# Patient Record
Sex: MALE | Race: WHITE | NOT HISPANIC OR LATINO | Employment: FULL TIME | URBAN - METROPOLITAN AREA
[De-identification: names, ages, dates, MRNs, and addresses within clinical notes are randomized per-mention and may not be internally consistent; named-entity substitution may affect disease eponyms.]

---

## 2018-07-31 ENCOUNTER — OFFICE VISIT (OUTPATIENT)
Dept: FAMILY MEDICINE CLINIC | Facility: CLINIC | Age: 53
End: 2018-07-31

## 2018-07-31 VITALS
DIASTOLIC BLOOD PRESSURE: 70 MMHG | BODY MASS INDEX: 29.92 KG/M2 | WEIGHT: 202 LBS | HEIGHT: 69 IN | TEMPERATURE: 97.7 F | SYSTOLIC BLOOD PRESSURE: 128 MMHG | HEART RATE: 108 BPM | OXYGEN SATURATION: 96 %

## 2018-07-31 DIAGNOSIS — K21.00 GASTROESOPHAGEAL REFLUX DISEASE WITH ESOPHAGITIS: Primary | ICD-10-CM

## 2018-07-31 PROCEDURE — 99204 OFFICE O/P NEW MOD 45 MIN: CPT | Performed by: FAMILY MEDICINE

## 2018-07-31 RX ORDER — OMEGA-3 FATTY ACIDS/FISH OIL 300-1000MG
1 CAPSULE ORAL EVERY 6 HOURS SCHEDULED
COMMUNITY

## 2018-07-31 RX ORDER — PANTOPRAZOLE SODIUM 40 MG/1
TABLET, DELAYED RELEASE ORAL
COMMUNITY
Start: 2018-05-08 | End: 2018-07-31 | Stop reason: SDUPTHER

## 2018-07-31 RX ORDER — PANTOPRAZOLE SODIUM 40 MG/1
40 TABLET, DELAYED RELEASE ORAL DAILY
Qty: 90 TABLET | Refills: 3 | Status: SHIPPED | OUTPATIENT
Start: 2018-07-31 | End: 2019-03-25 | Stop reason: SDUPTHER

## 2018-07-31 NOTE — PROGRESS NOTES
SUBJECTIVE:  The patient is a 53-year-old white male is here for the first time.  He moved from [facility] just under a year ago.  He enjoys general good health.  He has a history of GERD which is controlled with Protonix.  He tragically lost a son just under 2 years ago.  He exercises daily.  He has no actual physical complaints today he states he had lab work little over a year ago.  He is up-to-date on colonoscopy.    PAST MEDICAL HISTORY:  Reviewed.  Social history reviewed  Family history reviewed  Surgical history reviewed    REVIEW OF SYSTEMS:  Please see above; 14 point ROS negative.      OBJECTIVE: Vitals signs are reviewed and are stable.  Alert and oriented ×3  HEENT: PERRLA.   Neck:  Supple.  No bruits or masses  Lungs:  Clear.    Heart:  Regular rate and rhythm.  No murmur gallop or rub  Abdomen:   Soft, nontender.  Bowel sounds present  Extremities:  No cyanosis, clubbing or edema.  Full range of motion.    ASSESSMENT:    GERD    PLAN: CMP fasting lipids TSH thyroid profile CBC PSA ordered.  (Patient declined a digital rectal exam).  He will follow up on his labs.  Healthy lifestyle discussed.  His medication will be refilled.  He will call if problems.    Dictated utilizing Dragon dictation.

## 2018-08-02 ENCOUNTER — LAB (OUTPATIENT)
Dept: FAMILY MEDICINE CLINIC | Facility: CLINIC | Age: 53
End: 2018-08-02

## 2018-08-02 DIAGNOSIS — K21.00 GASTROESOPHAGEAL REFLUX DISEASE WITH ESOPHAGITIS: ICD-10-CM

## 2018-08-02 LAB
25(OH)D3 SERPL-MCNC: 32.5 NG/ML (ref 30–100)
ALBUMIN SERPL-MCNC: 4.7 G/DL (ref 3.5–5.2)
ALBUMIN/GLOB SERPL: 2.4 G/DL
ALP SERPL-CCNC: 51 U/L (ref 39–117)
ALT SERPL W P-5'-P-CCNC: 27 U/L (ref 1–41)
ANION GAP SERPL CALCULATED.3IONS-SCNC: 13.3 MMOL/L
AST SERPL-CCNC: 20 U/L (ref 1–40)
BACTERIA UR QL AUTO: NORMAL /HPF
BILIRUB SERPL-MCNC: 0.4 MG/DL (ref 0.1–1.2)
BILIRUB UR QL STRIP: NEGATIVE
BUN BLD-MCNC: 25 MG/DL (ref 6–20)
BUN/CREAT SERPL: 18 (ref 7–25)
CALCIUM SPEC-SCNC: 9.9 MG/DL (ref 8.6–10.5)
CHLORIDE SERPL-SCNC: 105 MMOL/L (ref 98–107)
CHOLEST SERPL-MCNC: 211 MG/DL (ref 0–200)
CLARITY UR: CLEAR
CO2 SERPL-SCNC: 25.7 MMOL/L (ref 22–29)
COLOR UR: YELLOW
CREAT BLD-MCNC: 1.39 MG/DL (ref 0.76–1.27)
ERYTHROCYTE [DISTWIDTH] IN BLOOD BY AUTOMATED COUNT: 12.8 % (ref 4.5–15)
GFR SERPL CREATININE-BSD FRML MDRD: 53 ML/MIN/1.73
GLOBULIN UR ELPH-MCNC: 2 GM/DL
GLUCOSE BLD-MCNC: 96 MG/DL (ref 65–99)
GLUCOSE UR STRIP-MCNC: NEGATIVE MG/DL
HCT VFR BLD AUTO: 47.5 % (ref 35–60)
HDLC SERPL-MCNC: 49 MG/DL (ref 40–60)
HGB BLD-MCNC: 16.3 G/DL (ref 13.5–18)
HGB UR QL STRIP.AUTO: NEGATIVE
KETONES UR QL STRIP: NEGATIVE
LDLC SERPL CALC-MCNC: 139 MG/DL (ref 0–100)
LDLC/HDLC SERPL: 2.84 {RATIO}
LEUKOCYTE ESTERASE UR QL STRIP.AUTO: NEGATIVE
LYMPHOCYTES # BLD AUTO: 2.7 10*3/MM3 (ref 1.2–3.4)
LYMPHOCYTES NFR BLD AUTO: 39.1 % (ref 21–51)
MCH RBC QN AUTO: 32.1 PG (ref 26.1–33.1)
MCHC RBC AUTO-ENTMCNC: 34.3 G/DL (ref 33–37)
MCV RBC AUTO: 93.8 FL (ref 80–99)
MONOCYTES # BLD AUTO: 0.5 10*3/MM3 (ref 0.1–0.6)
MONOCYTES NFR BLD AUTO: 8 % (ref 2–9)
NEUTROPHILS # BLD AUTO: 3.6 10*3/MM3 (ref 1.4–6.5)
NEUTROPHILS NFR BLD AUTO: 52.9 % (ref 42–75)
NITRITE UR QL STRIP: NEGATIVE
PH UR STRIP.AUTO: 6 [PH] (ref 4.6–8)
PLATELET # BLD AUTO: 261 10*3/MM3 (ref 150–450)
PMV BLD AUTO: 7.5 FL (ref 7.1–10.5)
POTASSIUM BLD-SCNC: 5.1 MMOL/L (ref 3.5–5.2)
PROT SERPL-MCNC: 6.7 G/DL (ref 6–8.5)
PROT UR QL STRIP: NEGATIVE
PSA SERPL-MCNC: 2.99 NG/ML (ref 0–4)
RBC # BLD AUTO: 5.07 10*6/MM3 (ref 4–6)
RBC # UR: NORMAL /HPF
REF LAB TEST METHOD: NORMAL
SODIUM BLD-SCNC: 144 MMOL/L (ref 136–145)
SP GR UR STRIP: 1.02 (ref 1–1.03)
SQUAMOUS #/AREA URNS HPF: NORMAL /HPF
T-UPTAKE NFR SERPL: 1.19 TBI (ref 0.8–1.3)
T4 SERPL-MCNC: 7.06 MCG/DL (ref 4.5–11.7)
TRIGL SERPL-MCNC: 114 MG/DL (ref 0–150)
TSH SERPL DL<=0.05 MIU/L-ACNC: 3.9 MIU/ML (ref 0.27–4.2)
UROBILINOGEN UR QL STRIP: NORMAL
VLDLC SERPL-MCNC: 22.8 MG/DL (ref 5–40)
WBC NRBC COR # BLD: 6.8 10*3/MM3 (ref 4.5–10)
WBC UR QL AUTO: NORMAL /HPF

## 2018-08-02 PROCEDURE — 81001 URINALYSIS AUTO W/SCOPE: CPT | Performed by: FAMILY MEDICINE

## 2018-08-02 PROCEDURE — 82306 VITAMIN D 25 HYDROXY: CPT | Performed by: FAMILY MEDICINE

## 2018-08-02 PROCEDURE — 85025 COMPLETE CBC W/AUTO DIFF WBC: CPT | Performed by: FAMILY MEDICINE

## 2018-08-02 PROCEDURE — 80053 COMPREHEN METABOLIC PANEL: CPT | Performed by: FAMILY MEDICINE

## 2018-08-02 PROCEDURE — 84443 ASSAY THYROID STIM HORMONE: CPT | Performed by: FAMILY MEDICINE

## 2018-08-02 PROCEDURE — 84436 ASSAY OF TOTAL THYROXINE: CPT | Performed by: FAMILY MEDICINE

## 2018-08-02 PROCEDURE — 84153 ASSAY OF PSA TOTAL: CPT | Performed by: FAMILY MEDICINE

## 2018-08-02 PROCEDURE — 80061 LIPID PANEL: CPT | Performed by: FAMILY MEDICINE

## 2018-08-02 PROCEDURE — 84479 ASSAY OF THYROID (T3 OR T4): CPT | Performed by: FAMILY MEDICINE

## 2018-08-02 PROCEDURE — 36415 COLL VENOUS BLD VENIPUNCTURE: CPT | Performed by: FAMILY MEDICINE

## 2019-03-25 RX ORDER — PANTOPRAZOLE SODIUM 40 MG/1
40 TABLET, DELAYED RELEASE ORAL DAILY
Qty: 90 TABLET | Refills: 1 | Status: SHIPPED | OUTPATIENT
Start: 2019-03-25 | End: 2019-10-16 | Stop reason: SDUPTHER

## 2019-04-09 ENCOUNTER — OFFICE VISIT (OUTPATIENT)
Dept: FAMILY MEDICINE CLINIC | Facility: CLINIC | Age: 54
End: 2019-04-09

## 2019-04-09 VITALS
HEART RATE: 72 BPM | OXYGEN SATURATION: 96 % | SYSTOLIC BLOOD PRESSURE: 124 MMHG | HEIGHT: 69 IN | WEIGHT: 200.6 LBS | DIASTOLIC BLOOD PRESSURE: 80 MMHG | BODY MASS INDEX: 29.71 KG/M2 | TEMPERATURE: 98.7 F

## 2019-04-09 DIAGNOSIS — L30.9 PERIANAL DERMATITIS: ICD-10-CM

## 2019-04-09 DIAGNOSIS — E78.5 HYPERLIPIDEMIA, UNSPECIFIED HYPERLIPIDEMIA TYPE: Primary | ICD-10-CM

## 2019-04-09 DIAGNOSIS — L43.9 LICHEN PLANUS: ICD-10-CM

## 2019-04-09 DIAGNOSIS — N40.0 ENLARGED PROSTATE: ICD-10-CM

## 2019-04-09 LAB
ALBUMIN SERPL-MCNC: 4.7 G/DL (ref 3.5–5.2)
ALBUMIN/GLOB SERPL: 2.2 G/DL
ALP SERPL-CCNC: 52 U/L (ref 39–117)
ALT SERPL W P-5'-P-CCNC: 22 U/L (ref 1–41)
ANION GAP SERPL CALCULATED.3IONS-SCNC: 11.8 MMOL/L
AST SERPL-CCNC: 17 U/L (ref 1–40)
BILIRUB SERPL-MCNC: 0.5 MG/DL (ref 0.2–1.2)
BUN BLD-MCNC: 18 MG/DL (ref 6–20)
BUN/CREAT SERPL: 15.9 (ref 7–25)
CALCIUM SPEC-SCNC: 9.7 MG/DL (ref 8.6–10.5)
CHLORIDE SERPL-SCNC: 106 MMOL/L (ref 98–107)
CHOLEST SERPL-MCNC: 202 MG/DL (ref 0–200)
CO2 SERPL-SCNC: 26.2 MMOL/L (ref 22–29)
CREAT BLD-MCNC: 1.13 MG/DL (ref 0.76–1.27)
GFR SERPL CREATININE-BSD FRML MDRD: 67 ML/MIN/1.73
GLOBULIN UR ELPH-MCNC: 2.1 GM/DL
GLUCOSE BLD-MCNC: 103 MG/DL (ref 65–99)
HDLC SERPL-MCNC: 51 MG/DL (ref 40–60)
LDLC SERPL CALC-MCNC: 123 MG/DL (ref 0–100)
LDLC/HDLC SERPL: 2.42 {RATIO}
POTASSIUM BLD-SCNC: 4.9 MMOL/L (ref 3.5–5.2)
PROT SERPL-MCNC: 6.8 G/DL (ref 6–8.5)
SODIUM BLD-SCNC: 144 MMOL/L (ref 136–145)
TRIGL SERPL-MCNC: 138 MG/DL (ref 0–150)
VLDLC SERPL-MCNC: 27.6 MG/DL (ref 5–40)

## 2019-04-09 PROCEDURE — 80053 COMPREHEN METABOLIC PANEL: CPT | Performed by: INTERNAL MEDICINE

## 2019-04-09 PROCEDURE — 80061 LIPID PANEL: CPT | Performed by: INTERNAL MEDICINE

## 2019-04-09 PROCEDURE — 99214 OFFICE O/P EST MOD 30 MIN: CPT | Performed by: INTERNAL MEDICINE

## 2019-04-09 PROCEDURE — 36415 COLL VENOUS BLD VENIPUNCTURE: CPT | Performed by: INTERNAL MEDICINE

## 2019-04-09 RX ORDER — ENZYMES,DIGESTIVE
1 CAPSULE ORAL DAILY
COMMUNITY

## 2019-04-09 RX ORDER — CLOTRIMAZOLE AND BETAMETHASONE DIPROPIONATE 10; .64 MG/G; MG/G
CREAM TOPICAL 2 TIMES DAILY
Qty: 45 G | Refills: 0 | Status: SHIPPED | OUTPATIENT
Start: 2019-04-09 | End: 2021-10-14

## 2019-04-09 RX ORDER — TACROLIMUS 5 MG/1
1 CAPSULE ORAL 3 TIMES DAILY
COMMUNITY
End: 2022-09-20

## 2019-04-09 RX ORDER — GLUCOSAMINE/CHONDR SU A SOD 750-600 MG
1 TABLET ORAL DAILY
COMMUNITY

## 2019-04-09 NOTE — PROGRESS NOTES
Subjective   Dell Prescott is a 54 y.o. male.   Here for general checkup needs lipids rechecked also Nimesh perianal itching has known lichen planus  History of Present Illness   As above no prostatism complaints his family history prostate cancer wants his prostate rechecked we did initially order PSA but looking at that he had that in August so we will not duplicate that we did do a digital rectal exam at that point time prostate lobe is enlarged on the right get a urologist to evaluate him for same.  Itching perianal area is been going on for several months time no hypertension other type problems patient is trying to lose weight to help also with his lipids.  Patient is a non-smoker.  Family history for heart disease emphysema and prostate cancer.  Drug allergies are penicillin.  Review of Systems   All other systems reviewed and are negative.      Objective   Vitals:    04/09/19 0759   BP: 124/80   Pulse: 72   Temp: 98.7 °F (37.1 °C)   SpO2: 96%   Weight: 91 kg (200 lb 9.6 oz)     Physical Exam   Constitutional: He appears well-developed and well-nourished.   HENT:   Head: Normocephalic and atraumatic.   Eyes: Conjunctivae are normal. Pupils are equal, round, and reactive to light.   Cardiovascular: Normal rate, regular rhythm and normal heart sounds.   Pulmonary/Chest: Effort normal and breath sounds normal.   Abdominal: Soft. Bowel sounds are normal.   Genitourinary:   Genitourinary Comments: Penis without any problems scrotum without masses or detectable inguinal hernia.  Did do digital rectal exam done with right lower prostate being more prominent particularly laterally.  Given family history for prostate cancer with father would have patient see urology for same.  There is some mild dermatitis  Midline in the perineal area between the rectum and the scrotum itself will give Lotrisone cream not clear if this is lichen planus versus other we will use Lotrisone twice daily if not much improved in 1 week's time  patient is call his dermatologist.   Neurological: He is alert.   Unremarkable gait and station   Skin: Skin is warm and dry.   Nursing note and vitals reviewed.      No results found for: INR    Procedures    Assessment/Plan   .  1.  Hyperlipidemia await pending lab for further recommendations    2.  Enlarged prostate plan refer to Dr. Richardson Zheng for evaluation    3.  Perianal dermatitis Lotrisone cream to area twice daily continue that not safely.  1 week's time patient's call his dermatologist with a consideration of lichen planus also been among the possibilities    4.  Lichen planus as discussed above    Much of this encounter note is an electronic transcription/translation of spoken language to printed text.  The electronic translation of spoken language may permit erroneous, or at times, nonsensical words or phrases to be inadvertently transcribed.  Although I have reviewed the note for such errors, some may still exist. If there are questions or for further clarification, please contact me.

## 2019-10-16 ENCOUNTER — TELEPHONE (OUTPATIENT)
Dept: FAMILY MEDICINE CLINIC | Facility: CLINIC | Age: 54
End: 2019-10-16

## 2019-10-16 RX ORDER — PANTOPRAZOLE SODIUM 40 MG/1
TABLET, DELAYED RELEASE ORAL
Qty: 90 TABLET | Refills: 1 | Status: SHIPPED | OUTPATIENT
Start: 2019-10-16 | End: 2020-03-06

## 2019-10-16 NOTE — TELEPHONE ENCOUNTER
REFILL ON pantoprazole (PROTONIX) 40 MG EC tab    CALL PATIENT AND LET HIM KNOW IF THIS CAN BE CALLED IN OR NOT

## 2020-03-06 RX ORDER — PANTOPRAZOLE SODIUM 40 MG/1
TABLET, DELAYED RELEASE ORAL
Qty: 90 TABLET | Refills: 0 | Status: SHIPPED | OUTPATIENT
Start: 2020-03-06 | End: 2020-05-16

## 2020-05-16 RX ORDER — PANTOPRAZOLE SODIUM 40 MG/1
TABLET, DELAYED RELEASE ORAL
Qty: 90 TABLET | Refills: 0 | Status: SHIPPED | OUTPATIENT
Start: 2020-05-16 | End: 2020-08-05

## 2020-08-05 RX ORDER — PANTOPRAZOLE SODIUM 40 MG/1
TABLET, DELAYED RELEASE ORAL
Qty: 90 TABLET | Refills: 0 | Status: SHIPPED | OUTPATIENT
Start: 2020-08-05 | End: 2020-10-04

## 2020-10-04 RX ORDER — PANTOPRAZOLE SODIUM 40 MG/1
TABLET, DELAYED RELEASE ORAL
Qty: 90 TABLET | Refills: 0 | Status: SHIPPED | OUTPATIENT
Start: 2020-10-04 | End: 2020-12-30

## 2020-10-12 ENCOUNTER — OFFICE VISIT (OUTPATIENT)
Dept: FAMILY MEDICINE CLINIC | Facility: CLINIC | Age: 55
End: 2020-10-12

## 2020-10-12 VITALS
OXYGEN SATURATION: 96 % | DIASTOLIC BLOOD PRESSURE: 80 MMHG | BODY MASS INDEX: 31.37 KG/M2 | WEIGHT: 207 LBS | HEART RATE: 64 BPM | SYSTOLIC BLOOD PRESSURE: 142 MMHG | RESPIRATION RATE: 18 BRPM | TEMPERATURE: 97.5 F | HEIGHT: 68 IN

## 2020-10-12 DIAGNOSIS — K21.9 GASTROESOPHAGEAL REFLUX DISEASE WITHOUT ESOPHAGITIS: ICD-10-CM

## 2020-10-12 DIAGNOSIS — R21 RASH: ICD-10-CM

## 2020-10-12 DIAGNOSIS — Z51.81 MEDICATION MONITORING ENCOUNTER: ICD-10-CM

## 2020-10-12 DIAGNOSIS — Z12.5 SCREENING FOR PROSTATE CANCER: ICD-10-CM

## 2020-10-12 DIAGNOSIS — Z00.00 WELLNESS EXAMINATION: Primary | ICD-10-CM

## 2020-10-12 DIAGNOSIS — E78.5 HYPERLIPIDEMIA, UNSPECIFIED HYPERLIPIDEMIA TYPE: ICD-10-CM

## 2020-10-12 LAB
ALBUMIN SERPL-MCNC: 4.4 G/DL (ref 3.5–5.2)
ALBUMIN/GLOB SERPL: 2.1 G/DL
ALP SERPL-CCNC: 68 U/L (ref 39–117)
ALT SERPL W P-5'-P-CCNC: 22 U/L (ref 1–41)
ANION GAP SERPL CALCULATED.3IONS-SCNC: 9.7 MMOL/L (ref 5–15)
AST SERPL-CCNC: 22 U/L (ref 1–40)
BILIRUB SERPL-MCNC: 0.5 MG/DL (ref 0–1.2)
BUN SERPL-MCNC: 24 MG/DL (ref 6–20)
BUN/CREAT SERPL: 23.1 (ref 7–25)
CALCIUM SPEC-SCNC: 9.5 MG/DL (ref 8.6–10.5)
CHLORIDE SERPL-SCNC: 107 MMOL/L (ref 98–107)
CHOLEST SERPL-MCNC: 207 MG/DL (ref 0–200)
CO2 SERPL-SCNC: 26.3 MMOL/L (ref 22–29)
CREAT SERPL-MCNC: 1.04 MG/DL (ref 0.76–1.27)
DEVELOPER EXPIRATION DATE: NORMAL
DEVELOPER LOT NUMBER: NORMAL
ERYTHROCYTE [DISTWIDTH] IN BLOOD BY AUTOMATED COUNT: 12.8 % (ref 12.3–15.4)
EXPIRATION DATE: NORMAL
FECAL OCCULT BLOOD SCREEN, POC: NEGATIVE
GFR SERPL CREATININE-BSD FRML MDRD: 74 ML/MIN/1.73
GLOBULIN UR ELPH-MCNC: 2.1 GM/DL
GLUCOSE SERPL-MCNC: 106 MG/DL (ref 65–99)
HBA1C MFR BLD: 5.49 % (ref 4.8–5.6)
HCT VFR BLD AUTO: 47.2 % (ref 37.5–51)
HDLC SERPL-MCNC: 48 MG/DL (ref 40–60)
HGB BLD-MCNC: 15.5 G/DL (ref 13–17.7)
LDLC SERPL CALC-MCNC: 124 MG/DL (ref 0–100)
LDLC/HDLC SERPL: 2.48 {RATIO}
LYMPHOCYTES # BLD AUTO: 2.3 10*3/MM3 (ref 0.7–3.1)
LYMPHOCYTES NFR BLD AUTO: 41 % (ref 19.6–45.3)
Lab: NORMAL
MCH RBC QN AUTO: 31.7 PG (ref 26.6–33)
MCHC RBC AUTO-ENTMCNC: 32.8 G/DL (ref 31.5–35.7)
MCV RBC AUTO: 96.5 FL (ref 79–97)
MONOCYTES # BLD AUTO: 0.3 10*3/MM3 (ref 0.1–0.9)
MONOCYTES NFR BLD AUTO: 5.2 % (ref 5–12)
NEGATIVE CONTROL: NEGATIVE
NEUTROPHILS NFR BLD AUTO: 3 10*3/MM3 (ref 1.7–7)
NEUTROPHILS NFR BLD AUTO: 53.8 % (ref 42.7–76)
PLATELET # BLD AUTO: 239 10*3/MM3 (ref 140–450)
PMV BLD AUTO: 7.2 FL (ref 6–12)
POSITIVE CONTROL: POSITIVE
POTASSIUM SERPL-SCNC: 4.8 MMOL/L (ref 3.5–5.2)
PROT SERPL-MCNC: 6.5 G/DL (ref 6–8.5)
PSA SERPL-MCNC: 2.84 NG/ML (ref 0–4)
RBC # BLD AUTO: 4.89 10*6/MM3 (ref 4.14–5.8)
SODIUM SERPL-SCNC: 143 MMOL/L (ref 136–145)
TRIGL SERPL-MCNC: 201 MG/DL (ref 0–150)
VLDLC SERPL-MCNC: 35 MG/DL (ref 5–40)
WBC # BLD AUTO: 5.6 10*3/MM3 (ref 3.4–10.8)

## 2020-10-12 PROCEDURE — 83036 HEMOGLOBIN GLYCOSYLATED A1C: CPT | Performed by: FAMILY MEDICINE

## 2020-10-12 PROCEDURE — 82270 OCCULT BLOOD FECES: CPT | Performed by: FAMILY MEDICINE

## 2020-10-12 PROCEDURE — 99396 PREV VISIT EST AGE 40-64: CPT | Performed by: FAMILY MEDICINE

## 2020-10-12 PROCEDURE — 85025 COMPLETE CBC W/AUTO DIFF WBC: CPT | Performed by: FAMILY MEDICINE

## 2020-10-12 PROCEDURE — G0103 PSA SCREENING: HCPCS | Performed by: FAMILY MEDICINE

## 2020-10-12 PROCEDURE — 80061 LIPID PANEL: CPT | Performed by: FAMILY MEDICINE

## 2020-10-12 PROCEDURE — 80053 COMPREHEN METABOLIC PANEL: CPT | Performed by: FAMILY MEDICINE

## 2020-10-12 PROCEDURE — 36415 COLL VENOUS BLD VENIPUNCTURE: CPT | Performed by: FAMILY MEDICINE

## 2020-10-12 RX ORDER — CLOTRIMAZOLE AND BETAMETHASONE DIPROPIONATE 10; .64 MG/G; MG/G
CREAM TOPICAL 2 TIMES DAILY
Qty: 1 EACH | Refills: 0 | Status: SHIPPED | OUTPATIENT
Start: 2020-10-12 | End: 2021-10-14

## 2020-10-12 NOTE — PROGRESS NOTES
"SUBJECTIVE:  The patient is a 55-year-old male.  He is here for a wellness exam.  He has a history of elevated cholesterol.  He is doing well.  He works very hard.  He has gained weight during the Covid pandemic.  Today complains of a rash in his left inner groin.    PAST MEDICAL HISTORY:  Reviewed.    REVIEW OF SYSTEMS:  Please see above.  All others reviewed and are negative.      OBJECTIVE:   /80 (BP Location: Left arm, Patient Position: Sitting)   Pulse 64   Temp 97.5 °F (36.4 °C) (Infrared)   Resp 18   Ht 172.7 cm (68\")   Wt 93.9 kg (207 lb)   SpO2 96%   BMI 31.47 kg/m²    Vitals signs are reviewed and are stable.    General:  Well-nourished.  Alert and oriented x3 in no acute distress.  HEENT: PERRLA.   Neck:  Supple.   Lungs:  Clear.    Heart:  Regular rate and rhythm.   Abdomen:   Soft, nontender.  Red scaly rash noted left inner groin region  Rectal: Prostate soft nontender symmetrical 1-2+.  No masses.  Hemoccult negative.  Extremities:  No cyanosis, clubbing or edema.   Neurological:  Grossly intact without motor or sensory deficits.     ASSESSMENT:      Dell was seen today for wellness check up and rash inside left leg.    Diagnoses and all orders for this visit:    Wellness examination  -     PSA Screen; Future    Medication monitoring encounter  -     CBC & Differential  -     Comprehensive Metabolic Panel  -     Lipid Panel  -     Hemoglobin A1c  -     PSA Screen; Future    Gastroesophageal reflux disease without esophagitis  -     PSA Screen; Future    Hyperlipidemia, unspecified hyperlipidemia type  -     PSA Screen; Future    Rash  -     PSA Screen; Future    Screening for prostate cancer  -     PSA Screen; Future    Other orders  -     clotrimazole-betamethasone (Lotrisone) 1-0.05 % cream; Apply  topically to the appropriate area as directed 2 (Two) Times a Day.         PLAN: CBC CMP PSA CBC hemoglobin A1c ordered.  Healthy lifestyle discussed.  Follow-up on labs.  Lotrisone " prescribed for tinea cruris.  Call if problems.    Dictated utilizing Dragon dictation.

## 2020-10-14 ENCOUNTER — TELEPHONE (OUTPATIENT)
Dept: FAMILY MEDICINE CLINIC | Facility: CLINIC | Age: 55
End: 2020-10-14

## 2020-10-14 NOTE — TELEPHONE ENCOUNTER
Your lipids are not quite at goal.  Work on diet and exercise like we discussed.  Recommend rechecking these in about 4 months to see if you have got him in range.    LM INFORMING PT

## 2020-10-14 NOTE — TELEPHONE ENCOUNTER
PATIENT STATES: that he would like a phone call for lab results please advise     PATIENT CAN BE REACHED ON:[phone]

## 2020-10-14 NOTE — TELEPHONE ENCOUNTER
Patient called in returning a call to [place] regarding his test results, he says he has questions.    Best call back # [phone]    Reason for Disposition   Message left on identified voice mail    Protocols used: NO CONTACT OR DUPLICATE CONTACT CALL-ADULT-AH    Upon soft trx for [facility]), pt hung up. Called pt back immediately , no answer , left VM to call back office. Attention Provider: Thank you for allowing me to participate in the care of your patient. The patient was connected to triage in response to information provided to the ECC. Please do not respond through this encounter as the response is not directed to a shared pool.

## 2020-12-30 RX ORDER — PANTOPRAZOLE SODIUM 40 MG/1
TABLET, DELAYED RELEASE ORAL
Qty: 90 TABLET | Refills: 0 | Status: SHIPPED | OUTPATIENT
Start: 2020-12-30 | End: 2021-03-29

## 2021-03-29 RX ORDER — PANTOPRAZOLE SODIUM 40 MG/1
TABLET, DELAYED RELEASE ORAL
Qty: 90 TABLET | Refills: 0 | Status: SHIPPED | OUTPATIENT
Start: 2021-03-29 | End: 2021-06-14

## 2021-03-30 ENCOUNTER — BULK ORDERING (OUTPATIENT)
Dept: CASE MANAGEMENT | Facility: OTHER | Age: 56
End: 2021-03-30

## 2021-03-30 DIAGNOSIS — Z23 IMMUNIZATION DUE: ICD-10-CM

## 2021-06-14 RX ORDER — PANTOPRAZOLE SODIUM 40 MG/1
TABLET, DELAYED RELEASE ORAL
Qty: 90 TABLET | Refills: 0 | Status: SHIPPED | OUTPATIENT
Start: 2021-06-14 | End: 2021-09-09 | Stop reason: SDUPTHER

## 2021-09-03 ENCOUNTER — TELEPHONE (OUTPATIENT)
Dept: FAMILY MEDICINE CLINIC | Facility: CLINIC | Age: 56
End: 2021-09-03

## 2021-09-03 NOTE — TELEPHONE ENCOUNTER
Caller: Dell Prescott    Relationship: Self    Best call back number: [phone]*    PATIENT SCHEDULED FOR PHYSICAL WITH ABI KURTZ, FOR 9/30/21, DUE TO DR. GARCES' FIRST AVAILABLE PHYSICAL APPT NOT UNTIL April, 2021. PATIENT REQUESTED A SOONER APPT.

## 2021-09-09 RX ORDER — PANTOPRAZOLE SODIUM 40 MG/1
40 TABLET, DELAYED RELEASE ORAL DAILY
Qty: 90 TABLET | Refills: 0 | Status: SHIPPED | OUTPATIENT
Start: 2021-09-09 | End: 2021-12-01 | Stop reason: SDUPTHER

## 2021-09-09 NOTE — TELEPHONE ENCOUNTER
Caller: Dell Prescott    Relationship: Self    Best call back number: [phone]    Medication needed:   Requested Prescriptions     Pending Prescriptions Disp Refills   • pantoprazole (PROTONIX) 40 MG EC tablet 90 tablet 0     Sig: Take 1 tablet by mouth Daily.       When do you need the refill by: 09/09/2021    What additional details did the patient provide when requesting the medication: THE PATIENT HAS ONE TABLET LEFT OF THIS MEDICATION. HE HAS AN APPOINTMENT SCHEDULED 09/30 WITH ABI KURTZ. PLEASE ADVISE.    Does the patient have less than a 3 day supply:  [x] Yes  [] No    What is the patient's preferred pharmacy: [facility]/PHARMACY #[id number] - [place], [place] - [address] [phone] [facility] [phone] FX

## 2021-10-14 ENCOUNTER — OFFICE VISIT (OUTPATIENT)
Dept: FAMILY MEDICINE CLINIC | Facility: CLINIC | Age: 56
End: 2021-10-14

## 2021-10-14 VITALS
OXYGEN SATURATION: 90 % | SYSTOLIC BLOOD PRESSURE: 120 MMHG | HEIGHT: 68 IN | HEART RATE: 86 BPM | BODY MASS INDEX: 30.62 KG/M2 | RESPIRATION RATE: 20 BRPM | DIASTOLIC BLOOD PRESSURE: 70 MMHG | WEIGHT: 202 LBS | TEMPERATURE: 97.8 F

## 2021-10-14 DIAGNOSIS — E78.5 DYSLIPIDEMIA: ICD-10-CM

## 2021-10-14 DIAGNOSIS — Z12.5 PROSTATE CANCER SCREENING: ICD-10-CM

## 2021-10-14 DIAGNOSIS — Z13.6 ENCOUNTER FOR LIPID SCREENING FOR CARDIOVASCULAR DISEASE: ICD-10-CM

## 2021-10-14 DIAGNOSIS — Z13.220 ENCOUNTER FOR LIPID SCREENING FOR CARDIOVASCULAR DISEASE: ICD-10-CM

## 2021-10-14 DIAGNOSIS — R79.89 ELEVATED SERUM CREATININE: Primary | ICD-10-CM

## 2021-10-14 DIAGNOSIS — K21.9 GASTROESOPHAGEAL REFLUX DISEASE, UNSPECIFIED WHETHER ESOPHAGITIS PRESENT: ICD-10-CM

## 2021-10-14 DIAGNOSIS — Z00.00 ENCOUNTER FOR WELLNESS EXAMINATION: Primary | ICD-10-CM

## 2021-10-14 LAB
ALBUMIN SERPL-MCNC: 4.8 G/DL (ref 3.5–5.2)
ALBUMIN/GLOB SERPL: 2.3 G/DL
ALP SERPL-CCNC: 70 U/L (ref 39–117)
ALT SERPL W P-5'-P-CCNC: 29 U/L (ref 1–41)
ANION GAP SERPL CALCULATED.3IONS-SCNC: 8.9 MMOL/L (ref 5–15)
AST SERPL-CCNC: 21 U/L (ref 1–40)
BILIRUB SERPL-MCNC: 0.5 MG/DL (ref 0–1.2)
BUN SERPL-MCNC: 26 MG/DL (ref 6–20)
BUN/CREAT SERPL: 18.8 (ref 7–25)
CALCIUM SPEC-SCNC: 10.3 MG/DL (ref 8.6–10.5)
CHLORIDE SERPL-SCNC: 106 MMOL/L (ref 98–107)
CHOLEST SERPL-MCNC: 213 MG/DL (ref 0–200)
CO2 SERPL-SCNC: 26.1 MMOL/L (ref 22–29)
CREAT SERPL-MCNC: 1.38 MG/DL (ref 0.76–1.27)
ERYTHROCYTE [DISTWIDTH] IN BLOOD BY AUTOMATED COUNT: 12.7 % (ref 12.3–15.4)
GFR SERPL CREATININE-BSD FRML MDRD: 53 ML/MIN/1.73
GLOBULIN UR ELPH-MCNC: 2.1 GM/DL
GLUCOSE SERPL-MCNC: 102 MG/DL (ref 65–99)
HCT VFR BLD AUTO: 45.8 % (ref 37.5–51)
HDLC SERPL-MCNC: 47 MG/DL (ref 40–60)
HGB BLD-MCNC: 15.3 G/DL (ref 13–17.7)
LDLC SERPL CALC-MCNC: 150 MG/DL (ref 0–100)
LDLC/HDLC SERPL: 3.15 {RATIO}
LYMPHOCYTES # BLD AUTO: 1.5 10*3/MM3 (ref 0.7–3.1)
LYMPHOCYTES NFR BLD AUTO: 29.9 % (ref 19.6–45.3)
MCH RBC QN AUTO: 32.5 PG (ref 26.6–33)
MCHC RBC AUTO-ENTMCNC: 33.3 G/DL (ref 31.5–35.7)
MCV RBC AUTO: 97.4 FL (ref 79–97)
MONOCYTES # BLD AUTO: 0.1 10*3/MM3 (ref 0.1–0.9)
MONOCYTES NFR BLD AUTO: 2.9 % (ref 5–12)
NEUTROPHILS NFR BLD AUTO: 3.3 10*3/MM3 (ref 1.7–7)
NEUTROPHILS NFR BLD AUTO: 67.2 % (ref 42.7–76)
PLATELET # BLD AUTO: 258 10*3/MM3 (ref 140–450)
PMV BLD AUTO: 7.2 FL (ref 6–12)
POTASSIUM SERPL-SCNC: 4.7 MMOL/L (ref 3.5–5.2)
PROT SERPL-MCNC: 6.9 G/DL (ref 6–8.5)
PSA SERPL-MCNC: 3.77 NG/ML (ref 0–4)
RBC # BLD AUTO: 4.7 10*6/MM3 (ref 4.14–5.8)
SODIUM SERPL-SCNC: 141 MMOL/L (ref 136–145)
TRIGL SERPL-MCNC: 90 MG/DL (ref 0–150)
TSH SERPL DL<=0.05 MIU/L-ACNC: 2.33 UIU/ML (ref 0.27–4.2)
VLDLC SERPL-MCNC: 16 MG/DL (ref 5–40)
WBC # BLD AUTO: 4.9 10*3/MM3 (ref 3.4–10.8)

## 2021-10-14 PROCEDURE — 36415 COLL VENOUS BLD VENIPUNCTURE: CPT | Performed by: NURSE PRACTITIONER

## 2021-10-14 PROCEDURE — 84443 ASSAY THYROID STIM HORMONE: CPT | Performed by: NURSE PRACTITIONER

## 2021-10-14 PROCEDURE — G0103 PSA SCREENING: HCPCS | Performed by: NURSE PRACTITIONER

## 2021-10-14 PROCEDURE — 80053 COMPREHEN METABOLIC PANEL: CPT | Performed by: NURSE PRACTITIONER

## 2021-10-14 PROCEDURE — 85025 COMPLETE CBC W/AUTO DIFF WBC: CPT | Performed by: NURSE PRACTITIONER

## 2021-10-14 PROCEDURE — 99396 PREV VISIT EST AGE 40-64: CPT | Performed by: NURSE PRACTITIONER

## 2021-10-14 PROCEDURE — 80061 LIPID PANEL: CPT | Performed by: NURSE PRACTITIONER

## 2021-10-14 RX ORDER — TERBINAFINE HYDROCHLORIDE 250 MG/1
250 TABLET ORAL DAILY
COMMUNITY
End: 2022-09-20

## 2021-10-14 NOTE — PROGRESS NOTES
"Chief Complaint  wellness check up (due labs and psa)    Subjective          Dell Prescott presents to [facility]  History of Present Illness  Here today for wellness exam, patient of Dr. Hernández, new patient to me today, he is fasting today, denies smoking, states diet is good, working on exercise program, he is UTD eye exam, also UTD dentist.   With gerd, taking protonix 40mg daily, tolerating well. Also taking Lamisil per podiatry Dr. Tello.   Thinks last colonoscopy 2016, normal.       Objective   Vital Signs:   /70 (BP Location: Left arm, Patient Position: Sitting)   Pulse 86   Temp 97.8 °F (36.6 °C) (Infrared)   Resp 20   Ht 172.7 cm (68\")   Wt 91.6 kg (202 lb)   SpO2 90%   BMI 30.71 kg/m²     Physical Exam  Vitals and nursing note reviewed.   Constitutional:       Appearance: He is well-developed.   HENT:      Head: Normocephalic.   Eyes:      Pupils: Pupils are equal, round, and reactive to light.   Cardiovascular:      Rate and Rhythm: Normal rate and regular rhythm.      Pulses: Normal pulses.      Heart sounds: Normal heart sounds.   Pulmonary:      Effort: Pulmonary effort is normal.      Breath sounds: Normal breath sounds.   Abdominal:      General: Abdomen is flat. Bowel sounds are normal.      Palpations: Abdomen is soft.      Tenderness: There is no abdominal tenderness.   Skin:     General: Skin is warm and dry.   Neurological:      Mental Status: He is alert and oriented to person, place, and time.      Cranial Nerves: Cranial nerves are intact.      Sensory: Sensation is intact.      Motor: Motor function is intact.      Coordination: Coordination is intact.   Psychiatric:         Behavior: Behavior normal.         Judgment: Judgment normal.        Result Review :                 Assessment and Plan    Diagnoses and all orders for this visit:    1. Encounter for wellness examination (Primary)  -     CBC & Differential  -     Comprehensive Metabolic " Panel  -     Lipid Panel  -     TSH  -     PSA Screen    2. Prostate cancer screening  -     CBC & Differential  -     Comprehensive Metabolic Panel  -     Lipid Panel  -     TSH  -     PSA Screen    3. Encounter for lipid screening for cardiovascular disease  -     CBC & Differential  -     Comprehensive Metabolic Panel  -     Lipid Panel  -     TSH  -     PSA Screen    4. Gastroesophageal reflux disease, unspecified whether esophagitis present  -     CBC & Differential  -     Comprehensive Metabolic Panel  -     Lipid Panel  -     TSH  -     PSA Screen        Follow Up   No follow-ups on file.  Patient was given instructions and counseling regarding his condition or for health maintenance advice. Please see specific information pulled into the AVS if appropriate.     Labs today will call with results.   Cont diet and exercise,   Deferred jim today,   Patient agrees with plan of care and understands instructions. Call if worsening symptoms or any problems or concerns.

## 2021-10-14 NOTE — PATIENT INSTRUCTIONS
Labs today will call with results.   Cont diet and exercise,   Deferred jim today,   Patient agrees with plan of care and understands instructions. Call if worsening symptoms or any problems or concerns.

## 2021-12-01 RX ORDER — PANTOPRAZOLE SODIUM 40 MG/1
40 TABLET, DELAYED RELEASE ORAL DAILY
Qty: 90 TABLET | Refills: 0 | Status: SHIPPED | OUTPATIENT
Start: 2021-12-01 | End: 2022-02-23

## 2021-12-01 NOTE — TELEPHONE ENCOUNTER
Caller: Dell Prescott    Relationship: Self    Best call back number: [phone]    Requested Prescriptions:   Requested Prescriptions     Pending Prescriptions Disp Refills   • pantoprazole (PROTONIX) 40 MG EC tablet 90 tablet 0     Sig: Take 1 tablet by mouth Daily.        Pharmacy where request should be sent: [facility]/PHARMACY #[id number] [place], [place] - [address] [phone] [facility] [phone] FX     Additional details provided by patient: THE PATIENT IS OUT OF THIS MEDICATION. HE NEEDS A 90-DAY SUPPLY SENT TO THE PHARMACY ASAP.    Does the patient have less than a 3 day supply:  [x] Yes  [] No    Kae Quiroz Rep   12/01/21 13:25 EST

## 2022-01-19 ENCOUNTER — TELEPHONE (OUTPATIENT)
Dept: FAMILY MEDICINE CLINIC | Facility: CLINIC | Age: 57
End: 2022-01-19

## 2022-01-19 NOTE — TELEPHONE ENCOUNTER
Caller: Dell Prescott    Relationship to patient: Self    Best call back number: [phone]     Patient is needing: PATIENT IS CALLING TO STATE HE HAS BEEN BATTLING THE COVID VIRUS FOR ALMOST 2 WEEKS.  HE STATES HE IS HAVING AN UNCONTROLLABLE SHAKE INSIDE, WITH A PRODUCTIVE COUGH.  HE STATES THE MUCOUS IS GREENISH IN COLOR, MUSCLE ACHES, HEADACHE AND MASSIVE FATIGUE.  HE IS WANTING TO KNOW IF THERE IS ANY MEDICATION THAT HE CAN TAKE TO HELP WITH THESE SYMPTOMS.    PATIENT IS ASKING FOR A RETURN CALL WITH DR. GARCES RECOMMENDATIONS.    [facility]/pharmacy #[id number] - [place], [place] - [address] - [phone] [facility] [phone]   [phone]    PLEASE ADVISE.

## 2022-01-19 NOTE — TELEPHONE ENCOUNTER
I think you should be seen and evaluated.  You could possibly have pneumonia and may need prescription medication.

## 2022-01-27 ENCOUNTER — TELEPHONE (OUTPATIENT)
Dept: FAMILY MEDICINE CLINIC | Facility: CLINIC | Age: 57
End: 2022-01-27

## 2022-01-27 NOTE — TELEPHONE ENCOUNTER
Caller: Dell Prescott    Relationship: Self    Best call back number:[phone]    What medication are you requesting: PROMETHAZINE SYRUP -DM 6.25    What are your current symptoms: COVID AND PNUEMONIA      If a prescription is needed, what is your preferred pharmacy and phone number:  [facility]/pharmacy #[id number] - [place], [place] - [address] [phone] [facility] [phone]         Additional notes: PATIENT RECEIVED PROMETHAZINE COUGH SYRUP FROM URGENT CARE. HE WAS DIAGNOSED WITH COVID AN PNEUMONIA. PATIENT IS REQUESTING A CALL BACK FROM DR GARCES OR NURSE TO DISCUSS TREATMENTS.

## 2022-02-01 ENCOUNTER — TELEPHONE (OUTPATIENT)
Dept: FAMILY MEDICINE CLINIC | Facility: CLINIC | Age: 57
End: 2022-02-01

## 2022-02-01 NOTE — TELEPHONE ENCOUNTER
Caller: Dell Prescott    Relationship: Self    Best call back number: [phone]*    What is the best time to reach you: ANYTIME    Who are you requesting to speak with (clinical staff, provider,  specific staff member): CLINICAL    What was the call regarding: PATIENT CALLED REQUESTING A CALL BACK WITH THE STATUS OF REFILL OF PROMETHAZINE COUGH SYRUP.    Do you require a callback: YES

## 2022-02-23 RX ORDER — PANTOPRAZOLE SODIUM 40 MG/1
TABLET, DELAYED RELEASE ORAL
Qty: 90 TABLET | Refills: 0 | Status: SHIPPED | OUTPATIENT
Start: 2022-02-23 | End: 2022-05-19

## 2022-05-19 RX ORDER — PANTOPRAZOLE SODIUM 40 MG/1
TABLET, DELAYED RELEASE ORAL
Qty: 90 TABLET | Refills: 0 | Status: SHIPPED | OUTPATIENT
Start: 2022-05-19 | End: 2022-08-10

## 2022-08-10 RX ORDER — PANTOPRAZOLE SODIUM 40 MG/1
TABLET, DELAYED RELEASE ORAL
Qty: 90 TABLET | Refills: 0 | Status: SHIPPED | OUTPATIENT
Start: 2022-08-10 | End: 2022-10-24

## 2022-09-20 ENCOUNTER — OFFICE VISIT (OUTPATIENT)
Dept: FAMILY MEDICINE CLINIC | Facility: CLINIC | Age: 57
End: 2022-09-20

## 2022-09-20 VITALS
DIASTOLIC BLOOD PRESSURE: 60 MMHG | OXYGEN SATURATION: 98 % | RESPIRATION RATE: 18 BRPM | WEIGHT: 196 LBS | BODY MASS INDEX: 29.7 KG/M2 | HEIGHT: 68 IN | SYSTOLIC BLOOD PRESSURE: 110 MMHG | HEART RATE: 72 BPM | TEMPERATURE: 97.7 F

## 2022-09-20 DIAGNOSIS — Z23 ENCOUNTER FOR VACCINATION: ICD-10-CM

## 2022-09-20 DIAGNOSIS — Z00.00 HEALTH CARE MAINTENANCE: Primary | ICD-10-CM

## 2022-09-20 DIAGNOSIS — Z11.59 ENCOUNTER FOR HEPATITIS C SCREENING TEST FOR LOW RISK PATIENT: ICD-10-CM

## 2022-09-20 DIAGNOSIS — Z12.11 SCREEN FOR COLON CANCER: ICD-10-CM

## 2022-09-20 DIAGNOSIS — Z13.220 SCREENING FOR LIPID DISORDERS: ICD-10-CM

## 2022-09-20 DIAGNOSIS — Z12.5 SCREENING FOR PROSTATE CANCER: ICD-10-CM

## 2022-09-20 DIAGNOSIS — Z13.228 SCREENING FOR METABOLIC DISORDER: ICD-10-CM

## 2022-09-20 LAB
ALBUMIN SERPL-MCNC: 5 G/DL (ref 3.5–5.2)
ALBUMIN/GLOB SERPL: 2.8 G/DL
ALP SERPL-CCNC: 73 U/L (ref 39–117)
ALT SERPL W P-5'-P-CCNC: 21 U/L (ref 1–41)
ANION GAP SERPL CALCULATED.3IONS-SCNC: 12.2 MMOL/L (ref 5–15)
AST SERPL-CCNC: 22 U/L (ref 1–40)
BILIRUB SERPL-MCNC: 0.4 MG/DL (ref 0–1.2)
BUN SERPL-MCNC: 21 MG/DL (ref 6–20)
BUN/CREAT SERPL: 18.9 (ref 7–25)
CALCIUM SPEC-SCNC: 10.1 MG/DL (ref 8.6–10.5)
CHLORIDE SERPL-SCNC: 103 MMOL/L (ref 98–107)
CHOLEST SERPL-MCNC: 226 MG/DL (ref 0–200)
CO2 SERPL-SCNC: 25.8 MMOL/L (ref 22–29)
CREAT SERPL-MCNC: 1.11 MG/DL (ref 0.76–1.27)
EGFRCR SERPLBLD CKD-EPI 2021: 76.5 ML/MIN/1.73
ERYTHROCYTE [DISTWIDTH] IN BLOOD BY AUTOMATED COUNT: 13 % (ref 12.3–15.4)
GLOBULIN UR ELPH-MCNC: 1.8 GM/DL
GLUCOSE SERPL-MCNC: 93 MG/DL (ref 65–99)
HCT VFR BLD AUTO: 47.3 % (ref 37.5–51)
HCV AB SER DONR QL: NORMAL
HDLC SERPL-MCNC: 64 MG/DL (ref 40–60)
HGB BLD-MCNC: 16 G/DL (ref 13–17.7)
LDLC SERPL CALC-MCNC: 142 MG/DL (ref 0–100)
LDLC/HDLC SERPL: 2.18 {RATIO}
LYMPHOCYTES # BLD AUTO: 2.6 10*3/MM3 (ref 0.7–3.1)
LYMPHOCYTES NFR BLD AUTO: 36.5 % (ref 19.6–45.3)
MCH RBC QN AUTO: 32.8 PG (ref 26.6–33)
MCHC RBC AUTO-ENTMCNC: 33.9 G/DL (ref 31.5–35.7)
MCV RBC AUTO: 97 FL (ref 79–97)
MONOCYTES # BLD AUTO: 0.4 10*3/MM3 (ref 0.1–0.9)
MONOCYTES NFR BLD AUTO: 5.1 % (ref 5–12)
NEUTROPHILS NFR BLD AUTO: 4.1 10*3/MM3 (ref 1.7–7)
NEUTROPHILS NFR BLD AUTO: 58.4 % (ref 42.7–76)
PLATELET # BLD AUTO: 265 10*3/MM3 (ref 140–450)
PMV BLD AUTO: 7.4 FL (ref 6–12)
POTASSIUM SERPL-SCNC: 4.6 MMOL/L (ref 3.5–5.2)
PROT SERPL-MCNC: 6.8 G/DL (ref 6–8.5)
RBC # BLD AUTO: 4.88 10*6/MM3 (ref 4.14–5.8)
SODIUM SERPL-SCNC: 141 MMOL/L (ref 136–145)
TRIGL SERPL-MCNC: 112 MG/DL (ref 0–150)
TSH SERPL DL<=0.05 MIU/L-ACNC: 1.46 UIU/ML (ref 0.27–4.2)
VLDLC SERPL-MCNC: 20 MG/DL (ref 5–40)
WBC NRBC COR # BLD: 7 10*3/MM3 (ref 3.4–10.8)

## 2022-09-20 PROCEDURE — 86803 HEPATITIS C AB TEST: CPT

## 2022-09-20 PROCEDURE — 80053 COMPREHEN METABOLIC PANEL: CPT

## 2022-09-20 PROCEDURE — 90471 IMMUNIZATION ADMIN: CPT

## 2022-09-20 PROCEDURE — 80061 LIPID PANEL: CPT

## 2022-09-20 PROCEDURE — 84443 ASSAY THYROID STIM HORMONE: CPT

## 2022-09-20 PROCEDURE — 90715 TDAP VACCINE 7 YRS/> IM: CPT

## 2022-09-20 PROCEDURE — 85025 COMPLETE CBC W/AUTO DIFF WBC: CPT

## 2022-09-20 PROCEDURE — 99396 PREV VISIT EST AGE 40-64: CPT

## 2022-09-20 PROCEDURE — 36415 COLL VENOUS BLD VENIPUNCTURE: CPT

## 2022-09-20 NOTE — PROGRESS NOTES
"Chief Complaint  Wellness Check (Fasting for labs/ wanting to establish care)    Subjective        Dell Prescott presents to [facility]  History of Present Illness  Patient is a 57-year-old male, new patient to me, patient of Dr. Hernández last seen in office 10/14/2021 by Lilly Martinez.  Last labs drawn 10/14/2021 with history of elevated creatinine-1.38, elevated total cholesterol 213, LDL-150  Pt here today for annual physical with routine labs. Pt is fasting. States he has a balanced diet, exercises daily- lifts weights and caido 20-30 min per day- states son is a  and in school to be chiropractor. Pt has dental insurance, UTD. Sees eye doctor annually, due this year. Colonoscopy: Last FOBT completed 10/12/2020. Pt states he has had colonoscopy about 7 years ago, told to follow up in 10 years, no polyps removed.     Objective   Vital Signs:  /60 (BP Location: Left arm, Patient Position: Sitting)   Pulse 72   Temp 97.7 °F (36.5 °C) (Infrared)   Resp 18   Ht 172.7 cm (68\")   Wt 88.9 kg (196 lb)   SpO2 98%   BMI 29.80 kg/m²   Estimated body mass index is 29.8 kg/m² as calculated from the following:    Height as of this encounter: 172.7 cm (68\").    Weight as of this encounter: 88.9 kg (196 lb).          Physical Exam  Constitutional:       General: He is awake.      Appearance: Normal appearance.   HENT:      Head: Normocephalic and atraumatic.      Nose: Nose normal.      Mouth/Throat:      Mouth: Mucous membranes are moist.   Eyes:      Extraocular Movements: Extraocular movements intact.      Conjunctiva/sclera: Conjunctivae normal.      Pupils: Pupils are equal, round, and reactive to light.   Cardiovascular:      Rate and Rhythm: Normal rate and regular rhythm.      Pulses: Normal pulses.      Heart sounds: Normal heart sounds.   Pulmonary:      Effort: Pulmonary effort is normal.      Breath sounds: Normal breath sounds and air entry.   Abdominal:      " General: Bowel sounds are normal.      Palpations: Abdomen is soft.   Musculoskeletal:      Cervical back: Full passive range of motion without pain, normal range of motion and neck supple.   Skin:     General: Skin is warm and dry.   Neurological:      General: No focal deficit present.      Mental Status: He is alert and oriented to person, place, and time. Mental status is at baseline.   Psychiatric:         Attention and Perception: Attention normal.         Behavior: Behavior normal. Behavior is cooperative.        Result Review :                Assessment and Plan   Diagnoses and all orders for this visit:    1. Health care maintenance (Primary)  -     CBC & Differential  -     Comprehensive Metabolic Panel  -     Lipid Panel  -     TSH  -     PSA SCREENING; Future  -     Cancel: PSA SCREENING    2. Screening for lipid disorders  -     CBC & Differential  -     Comprehensive Metabolic Panel  -     Lipid Panel  -     TSH    3. Screening for metabolic disorder  -     CBC & Differential  -     Comprehensive Metabolic Panel  -     Lipid Panel  -     TSH    4. Screening for prostate cancer  -     PSA SCREENING; Future  -     Cancel: PSA SCREENING  -     PSA Screen; Future    5. Screen for colon cancer  -     Cologuard - Stool, Per Rectum; Future    6. Encounter for hepatitis C screening test for low risk patient  -     Hepatitis C antibody    7. Encounter for vaccination  -     Tdap Vaccine Greater Than or Equal To 8yo IM    Our office will call you or you will see a note on your [employer] truong when your in-office blood work results.    Counseling was provided on nutrition, physical activity, development, and preventative health. Patient verbalizes understanding no additional questions were asked. Patient agrees with plan of care and understands instructions. Call if any problems or concerns.          Follow Up   Return in about 27 days (around 10/17/2022), or if symptoms worsen or fail to improve, for for lab only  appoint to check PSA.  Patient was given instructions and counseling regarding his condition or for health maintenance advice. Please see specific information pulled into the AVS if appropriate.

## 2022-09-20 NOTE — PATIENT INSTRUCTIONS
Our office will call you or you will see a note on your [employer] truong when your in-office blood work results.    Counseling was provided on nutrition, physical activity, development, and preventative health. Patient verbalizes understanding no additional questions were asked. Patient agrees with plan of care and understands instructions. Call if any problems or concerns.

## 2022-09-21 DIAGNOSIS — E78.2 MIXED HYPERLIPIDEMIA: Primary | ICD-10-CM

## 2022-09-21 RX ORDER — ATORVASTATIN CALCIUM 10 MG/1
10 TABLET, FILM COATED ORAL DAILY
Qty: 90 TABLET | Refills: 1 | Status: SHIPPED | OUTPATIENT
Start: 2022-09-21

## 2022-10-24 ENCOUNTER — LAB (OUTPATIENT)
Dept: FAMILY MEDICINE CLINIC | Facility: CLINIC | Age: 57
End: 2022-10-24

## 2022-10-24 DIAGNOSIS — E78.2 MIXED HYPERLIPIDEMIA: ICD-10-CM

## 2022-10-24 DIAGNOSIS — Z12.5 SCREENING FOR PROSTATE CANCER: ICD-10-CM

## 2022-10-24 LAB — PSA SERPL-MCNC: 2.49 NG/ML (ref 0–4)

## 2022-10-24 PROCEDURE — G0103 PSA SCREENING: HCPCS

## 2022-10-24 PROCEDURE — 36415 COLL VENOUS BLD VENIPUNCTURE: CPT

## 2022-10-24 RX ORDER — PANTOPRAZOLE SODIUM 40 MG/1
TABLET, DELAYED RELEASE ORAL
Qty: 90 TABLET | Refills: 0 | Status: SHIPPED | OUTPATIENT
Start: 2022-10-24 | End: 2023-02-06

## 2023-02-06 RX ORDER — PANTOPRAZOLE SODIUM 40 MG/1
TABLET, DELAYED RELEASE ORAL
Qty: 90 TABLET | Refills: 0 | Status: SHIPPED | OUTPATIENT
Start: 2023-02-06

## 2023-03-16 ENCOUNTER — TELEPHONE (OUTPATIENT)
Dept: FAMILY MEDICINE CLINIC | Facility: CLINIC | Age: 58
End: 2023-03-16

## 2023-03-16 NOTE — TELEPHONE ENCOUNTER
"  Caller: Dell Prescott \"Don\"    Relationship: Self    Best call back number: [phone]    Who are you requesting to speak with (clinical staff, provider,  specific staff member): CLINICAL     What was the call regarding:PATIENT IS A  PATIENT, PATIENT WAS WONDERING IF HE CAN BE A NEW PATIENT OF , PATIENT DOESN'T SEE ANY REASON AS OF RIGHT NW TO COME IN. BUT WAS WONDERING IF HE WOULD BE ABLE TO JUST SWITCH.     Do you require a callback:YES   "

## 2023-05-08 RX ORDER — PANTOPRAZOLE SODIUM 40 MG/1
TABLET, DELAYED RELEASE ORAL
Qty: 90 TABLET | Refills: 0 | Status: SHIPPED | OUTPATIENT
Start: 2023-05-08

## 2023-08-02 RX ORDER — PANTOPRAZOLE SODIUM 40 MG/1
40 TABLET, DELAYED RELEASE ORAL DAILY
Qty: 90 TABLET | Refills: 0 | Status: SHIPPED | OUTPATIENT
Start: 2023-08-02

## 2023-10-30 RX ORDER — PANTOPRAZOLE SODIUM 40 MG/1
40 TABLET, DELAYED RELEASE ORAL DAILY
Qty: 30 TABLET | Refills: 0 | Status: SHIPPED | OUTPATIENT
Start: 2023-10-30

## 2024-05-30 RX ORDER — PANTOPRAZOLE SODIUM 40 MG/1
40 TABLET, DELAYED RELEASE ORAL DAILY
Qty: 30 TABLET | Refills: 0 | OUTPATIENT
Start: 2024-05-30

## 2024-09-03 RX ORDER — PANTOPRAZOLE SODIUM 40 MG/1
40 TABLET, DELAYED RELEASE ORAL DAILY
Qty: 30 TABLET | Refills: 0 | OUTPATIENT
Start: 2024-09-03

## 2025-01-29 PROBLEM — Z80.42 FAMILY HISTORY OF PROSTATE CANCER: Status: ACTIVE | Noted: 2023-09-11

## 2025-01-29 PROBLEM — K21.9 GERD (GASTROESOPHAGEAL REFLUX DISEASE): Status: ACTIVE | Noted: 2023-09-11

## 2025-01-29 PROBLEM — H33.21 DETACHED RETINA, RIGHT: Status: ACTIVE | Noted: 2023-09-11

## 2025-01-29 PROBLEM — E78.00 HYPERCHOLESTEROLEMIA: Status: ACTIVE | Noted: 2022-09-21

## 2025-01-31 ENCOUNTER — PATIENT ROUNDING (BHMG ONLY) (OUTPATIENT)
Dept: URGENT CARE | Facility: CLINIC | Age: 60
End: 2025-01-31
Payer: COMMERCIAL

## 2025-01-31 NOTE — ED NOTES
Thank you for letting us care for you in your recent visit to our [facility] [place]. We would love to hear about your experience with us.    We’re always looking for ways to make our patients’ experiences even better. Do you have any recommendations on ways we may improve?     I appreciate you taking the time to respond. Please be on the lookout for a survey about your recent visit from Dalila Cervantes via text or email. We would greatly appreciate if you could fill that out and turn it back in. We want your voice to be heard and we value your feedback.   Thank you for choosing [facility] for your healthcare needs.     Hugh Witt RN/Practice Manager